# Patient Record
Sex: MALE | Race: WHITE | Employment: FULL TIME
[De-identification: names, ages, dates, MRNs, and addresses within clinical notes are randomized per-mention and may not be internally consistent; named-entity substitution may affect disease eponyms.]

---

## 2022-04-04 ENCOUNTER — NURSE TRIAGE (OUTPATIENT)
Dept: OTHER | Facility: CLINIC | Age: 32
End: 2022-04-04

## 2022-04-06 ENCOUNTER — OFFICE VISIT (OUTPATIENT)
Dept: FAMILY MEDICINE CLINIC | Age: 32
End: 2022-04-06
Payer: COMMERCIAL

## 2022-04-06 VITALS
WEIGHT: 204 LBS | OXYGEN SATURATION: 97 % | TEMPERATURE: 98.7 F | DIASTOLIC BLOOD PRESSURE: 88 MMHG | HEIGHT: 70 IN | SYSTOLIC BLOOD PRESSURE: 131 MMHG | RESPIRATION RATE: 18 BRPM | BODY MASS INDEX: 29.2 KG/M2 | HEART RATE: 67 BPM

## 2022-04-06 DIAGNOSIS — R00.2 HEART PALPITATIONS: ICD-10-CM

## 2022-04-06 DIAGNOSIS — Z82.49 FAMILY HISTORY OF EARLY CAD: ICD-10-CM

## 2022-04-06 DIAGNOSIS — R00.0 TACHYCARDIA: Primary | ICD-10-CM

## 2022-04-06 PROCEDURE — 99204 OFFICE O/P NEW MOD 45 MIN: CPT | Performed by: FAMILY MEDICINE

## 2022-04-06 PROCEDURE — 93000 ELECTROCARDIOGRAM COMPLETE: CPT | Performed by: FAMILY MEDICINE

## 2022-04-06 NOTE — PATIENT INSTRUCTIONS
A Healthy Lifestyle: Care Instructions  Your Care Instructions     A healthy lifestyle can help you feel good, stay at a healthy weight, and have plenty of energy for both work and play. A healthy lifestyle is something you can share with your whole family. A healthy lifestyle also can lower your risk for serious health problems, such as high blood pressure, heart disease, and diabetes. You can follow a few steps listed below to improve your health and the health of your family. Follow-up care is a key part of your treatment and safety. Be sure to make and go to all appointments, and call your doctor if you are having problems. It's also a good idea to know your test results and keep a list of the medicines you take. How can you care for yourself at home? · Do not eat too much sugar, fat, or fast foods. You can still have dessert and treats now and then. The goal is moderation. · Start small to improve your eating habits. Pay attention to portion sizes, drink less juice and soda pop, and eat more fruits and vegetables. ? Eat a healthy amount of food. A 3-ounce serving of meat, for example, is about the size of a deck of cards. Fill the rest of your plate with vegetables and whole grains. ? Limit the amount of soda and sports drinks you have every day. Drink more water when you are thirsty. ? Eat plenty of fruits and vegetables every day. Have an apple or some carrot sticks as an afternoon snack instead of a candy bar. Try to have fruits and/or vegetables at every meal.  · Make exercise part of your daily routine. You may want to start with simple activities, such as walking, bicycling, or slow swimming. Try to be active 30 to 60 minutes every day. You do not need to do all 30 to 60 minutes all at once. For example, you can exercise 3 times a day for 10 or 20 minutes.  Moderate exercise is safe for most people, but it is always a good idea to talk to your doctor before starting an exercise program.  · Keep moving. Loida Shepherd the lawn, work in the garden, or ZIIBRA. Take the stairs instead of the elevator at work. · If you smoke, quit. People who smoke have an increased risk for heart attack, stroke, cancer, and other lung illnesses. Quitting is hard, but there are ways to boost your chance of quitting tobacco for good. ? Use nicotine gum, patches, or lozenges. ? Ask your doctor about stop-smoking programs and medicines. ? Keep trying. In addition to reducing your risk of diseases in the future, you will notice some benefits soon after you stop using tobacco. If you have shortness of breath or asthma symptoms, they will likely get better within a few weeks after you quit. · Limit how much alcohol you drink. Moderate amounts of alcohol (up to 2 drinks a day for men, 1 drink a day for women) are okay. But drinking too much can lead to liver problems, high blood pressure, and other health problems. Family health  If you have a family, there are many things you can do together to improve your health. · Eat meals together as a family as often as possible. · Eat healthy foods. This includes fruits, vegetables, lean meats and dairy, and whole grains. · Include your family in your fitness plan. Most people think of activities such as jogging or tennis as the way to fitness, but there are many ways you and your family can be more active. Anything that makes you breathe hard and gets your heart pumping is exercise. Here are some tips:  ? Walk to do errands or to take your child to school or the bus.  ? Go for a family bike ride after dinner instead of watching TV. Where can you learn more? Go to http://www.gray.com/  Enter L560 in the search box to learn more about \"A Healthy Lifestyle: Care Instructions. \"  Current as of: June 16, 2021               Content Version: 13.2  © 6531-5497 Healthwise, [employer].    Care instructions adapted under license by [employer] (which disclaims liability or warranty for this information). If you have questions about a medical condition or this instruction, always ask your healthcare professional. [address] any warranty or liability for your use of this information.

## 2022-04-06 NOTE — PROGRESS NOTES
Progress Note    he is a 28y.o. year old male who presents for evalution. Subjective:     Pt states he has episodes where his HR jumps up into the 160's out of no where, was just sitting there got an uneasy feeling and his HR was \"thru the roof\" started to get light headed and dizzy and some pressure in his chest.  Feels like he is out of breath when this occurs. Episodes last varies this last time lasted an hour and felt off from  until yesterday afternoon. He has a [employer] watch but is not able to use the ECG feature so no rhythm strip. HR shows up to 158 on  on his phone via watch. Dad  from MI age 52, was his 2rd. Denies fam hx of WPW or LQTS or SVT or other arrhythmia that he is aware of. States \"My dad side is littered with all sorts of heart issues\". Pt does not know last time his chol was checked. He is not smoker who does use dip tho and has been recommended to stop. Does not really exercise past couple of years. Couple months ago was walking around the neighborhood and got a weird feeling and had to walk home. Feeling of drained energy. Reviewed PmHx, RxHx, FmHx, SocHx, AllgHx and updated and dated in the chart. Review of Systems - negative except as listed above in the HPI    Objective:     Vitals:    22 1047   BP: 131/88   Pulse: 67   Resp: 18   Temp: 98.7 °F (37.1 °C)   TempSrc: Oral   SpO2: 97%   Weight: 204 lb (92.5 kg)   Height: 5' 10\" (1.778 m)       No current outpatient medications on file. No current facility-administered medications for this visit. Physical Examination: General appearance - alert, well appearing, and in no distress  Chest - clear to auscultation, no wheezes, rales or rhonchi, symmetric air entry  Heart - normal rate, regular rhythm, normal S1, S2, no murmurs, rubs, clicks or gallops      Assessment/ Plan:   Diagnoses and all orders for this visit:    1.  Tachycardia  -     METABOLIC PANEL, COMPREHENSIVE; Future  -     CBC W/O DIFF; Future  -     MAGNESIUM; Future  -     TSH 3RD GENERATION; Future  -     AMB POC EKG ROUTINE W/ 12 LEADS, INTER & REP  -     REFERRAL TO CARDIOLOGY  Possible SVT? Pt unable to use his ECG feature requires [employer] phone which he states due for new phone so he will get [employer] to open up feature. If he can get a rhythm strip during an episode this would be very helpful. Discussed if having symptomatic episode go to ED do NOT drive self. Normal ECG at 62 bpm  2. Family history of early CAD  -     LIPID PANEL; Future    3. Heart palpitations  -     REFERRAL TO CARDIOLOGY       Follow-up and Dispositions    · Return if symptoms worsen or fail to improve. I have discussed the diagnosis with the patient and the intended plan as seen in the above orders. The patient has received an after-visit summary and questions were answered concerning future plans. Pt conveyed understanding of plan.     Medication Side Effects and Warnings were discussed with patient    An electronic signature was used to authenticate this note  Mariaelena Diaz, DO

## 2022-04-06 NOTE — PROGRESS NOTES
Chief Complaint   Patient presents with    New Patient   [address]     Patient presents in office today as a NP to establish care. States that he has been having periods where his heart starts racing. Has an apple watch and states that it notifies him of his arrhythmia. States that HR jumps to 160 and starts to feel shaky. No other concerns. 1. Have you been to the ER, urgent care clinic since your last visit? Hospitalized since your last visit? No    2. Have you seen or consulted any other health care providers outside of the [address] [place] [address] since your last visit? Include any pap smears or colon screening.  No    Learning Assessment 4/6/2022   PRIMARY LEARNER Patient   PRIMARY LANGUAGE ENGLISH   LEARNER PREFERENCE PRIMARY LISTENING   ANSWERED BY self   RELATIONSHIP SELF

## 2022-04-07 LAB
ALBUMIN SERPL-MCNC: 4.3 G/DL (ref 3.5–5)
ALBUMIN/GLOB SERPL: 1.3 {RATIO} (ref 1.1–2.2)
ALP SERPL-CCNC: 55 U/L (ref 45–117)
ALT SERPL-CCNC: 32 U/L (ref 12–78)
ANION GAP SERPL CALC-SCNC: 6 MMOL/L (ref 5–15)
AST SERPL-CCNC: 15 U/L (ref 15–37)
BILIRUB SERPL-MCNC: 0.5 MG/DL (ref 0.2–1)
BUN SERPL-MCNC: 12 MG/DL (ref 6–20)
BUN/CREAT SERPL: 11 (ref 12–20)
CALCIUM SERPL-MCNC: 9 MG/DL (ref 8.5–10.1)
CHLORIDE SERPL-SCNC: 103 MMOL/L (ref 97–108)
CHOLEST SERPL-MCNC: 243 MG/DL
CO2 SERPL-SCNC: 27 MMOL/L (ref 21–32)
CREAT SERPL-MCNC: 1.09 MG/DL (ref 0.7–1.3)
ERYTHROCYTE [DISTWIDTH] IN BLOOD BY AUTOMATED COUNT: 11.5 % (ref 11.5–14.5)
GLOBULIN SER CALC-MCNC: 3.4 G/DL (ref 2–4)
GLUCOSE SERPL-MCNC: 98 MG/DL (ref 65–100)
HCT VFR BLD AUTO: 44.5 % (ref 36.6–50.3)
HDLC SERPL-MCNC: 33 MG/DL
HDLC SERPL: 7.4 {RATIO} (ref 0–5)
HGB BLD-MCNC: 15.3 G/DL (ref 12.1–17)
LDLC SERPL CALC-MCNC: 166.2 MG/DL (ref 0–100)
MAGNESIUM SERPL-MCNC: 2.2 MG/DL (ref 1.6–2.4)
MCH RBC QN AUTO: 30 PG (ref 26–34)
MCHC RBC AUTO-ENTMCNC: 34.4 G/DL (ref 30–36.5)
MCV RBC AUTO: 87.3 FL (ref 80–99)
NRBC # BLD: 0 K/UL (ref 0–0.01)
NRBC BLD-RTO: 0 PER 100 WBC
PLATELET # BLD AUTO: 286 K/UL (ref 150–400)
PMV BLD AUTO: 9.1 FL (ref 8.9–12.9)
POTASSIUM SERPL-SCNC: 4.2 MMOL/L (ref 3.5–5.1)
PROT SERPL-MCNC: 7.7 G/DL (ref 6.4–8.2)
RBC # BLD AUTO: 5.1 M/UL (ref 4.1–5.7)
SODIUM SERPL-SCNC: 136 MMOL/L (ref 136–145)
TRIGL SERPL-MCNC: 219 MG/DL (ref ?–150)
TSH SERPL DL<=0.05 MIU/L-ACNC: 1.63 UIU/ML (ref 0.36–3.74)
VLDLC SERPL CALC-MCNC: 43.8 MG/DL
WBC # BLD AUTO: 4.3 K/UL (ref 4.1–11.1)

## 2022-04-07 NOTE — PROGRESS NOTES
Thyroid magnesium and electrolytes are all normal.  Your cholesterol is elevated, work on low fat low cholesterol diet and start exercising, work up to 150 minutes of moderate intensity cardio a week. We should recheck your cholesterol in 3 months. Call and make an appt with the cardiologist as well.

## 2022-04-11 ENCOUNTER — OFFICE VISIT (OUTPATIENT)
Dept: CARDIOLOGY CLINIC | Age: 32
End: 2022-04-11
Payer: COMMERCIAL

## 2022-04-11 VITALS
SYSTOLIC BLOOD PRESSURE: 148 MMHG | HEART RATE: 106 BPM | WEIGHT: 204 LBS | OXYGEN SATURATION: 98 % | DIASTOLIC BLOOD PRESSURE: 88 MMHG | HEIGHT: 70 IN | BODY MASS INDEX: 29.2 KG/M2

## 2022-04-11 DIAGNOSIS — Z82.49 FH ISCHEMIC HEART DISEASE: ICD-10-CM

## 2022-04-11 DIAGNOSIS — R00.0 RACING HEART BEAT: Primary | ICD-10-CM

## 2022-04-11 DIAGNOSIS — I10 PRIMARY HYPERTENSION: ICD-10-CM

## 2022-04-11 DIAGNOSIS — R07.89 CHEST PRESSURE: ICD-10-CM

## 2022-04-11 DIAGNOSIS — E78.5 DYSLIPIDEMIA: ICD-10-CM

## 2022-04-11 PROCEDURE — 99204 OFFICE O/P NEW MOD 45 MIN: CPT | Performed by: SPECIALIST

## 2022-04-11 NOTE — PATIENT INSTRUCTIONS
High Blood Pressure: Care Instructions  Overview     It's normal for blood pressure to go up and down throughout the day. But if it stays up, you have high blood pressure. Another name for high blood pressure is hypertension. Despite what a lot of people think, high blood pressure usually doesn't cause headaches or make you feel dizzy or lightheaded. It usually has no symptoms. But it does increase your risk of stroke, heart attack, and other problems. You and your doctor will talk about your risks of these problems based on your blood pressure. Your doctor will give you a goal for your blood pressure. Your goal will be based on your health and your age. Lifestyle changes, such as eating healthy and being active, are always important to help lower blood pressure. You might also take medicine to reach your blood pressure goal.  Follow-up care is a key part of your treatment and safety. Be sure to make and go to all appointments, and call your doctor if you are having problems. It's also a good idea to know your test results and keep a list of the medicines you take. How can you care for yourself at home? Medical treatment  · If you stop taking your medicine, your blood pressure will go back up. You may take one or more types of medicine to lower your blood pressure. Be safe with medicines. Take your medicine exactly as prescribed. Call your doctor if you think you are having a problem with your medicine. · Talk to your doctor before you start taking aspirin every day. Aspirin can help certain people lower their risk of a heart attack or stroke. But taking aspirin isn't right for everyone, because it can cause serious bleeding. · See your doctor regularly. You may need to see the doctor more often at first or until your blood pressure comes down. · If you are taking blood pressure medicine, talk to your doctor before you take decongestants or anti-inflammatory medicine, such as ibuprofen.  Some of these medicines can raise blood pressure. · Learn how to check your blood pressure at home. Lifestyle changes  · Stay at a healthy weight. This is especially important if you put on weight around the waist. Losing even 10 pounds can help you lower your blood pressure. · If your doctor recommends it, get more exercise. Walking is a good choice. Bit by bit, increase the amount you walk every day. Try for at least 30 minutes on most days of the week. You also may want to swim, bike, or do other activities. · Avoid or limit alcohol. Talk to your doctor about whether you can drink any alcohol. · Try to limit how much sodium you eat to less than 2,300 milligrams (mg) a day. Your doctor may ask you to try to eat less than 1,500 mg a day. · Eat plenty of fruits (such as bananas and oranges), vegetables, legumes, whole grains, and low-fat dairy products. · Lower the amount of saturated fat in your diet. Saturated fat is found in animal products such as milk, cheese, and meat. Limiting these foods may help you lose weight and also lower your risk for heart disease. · Do not smoke. Smoking increases your risk for heart attack and stroke. If you need help quitting, talk to your doctor about stop-smoking programs and medicines. These can increase your chances of quitting for good. When should you call for help? Call 911  anytime you think you may need emergency care. This may mean having symptoms that suggest that your blood pressure is causing a serious heart or blood vessel problem. Your blood pressure may be over 180/120. For example, call 911 if:    · You have symptoms of a heart attack. These may include:  ? Chest pain or pressure, or a strange feeling in the chest.  ? Sweating. ? Shortness of breath. ? Nausea or vomiting. ? Pain, pressure, or a strange feeling in the back, neck, jaw, or upper belly or in one or both shoulders or arms. ? Lightheadedness or sudden weakness.   ? A fast or irregular heartbeat.     · You have symptoms of a stroke. These may include:  ? Sudden numbness, tingling, weakness, or loss of movement in your face, arm, or leg, especially on only one side of your body. ? Sudden vision changes. ? Sudden trouble speaking. ? Sudden confusion or trouble understanding simple statements. ? Sudden problems with walking or balance. ? A sudden, severe headache that is different from past headaches.     · You have severe back or belly pain. Do not wait until your blood pressure comes down on its own. Get help right away. Call your doctor now or seek immediate care if:    · Your blood pressure is much higher than normal (such as 180/120 or higher), but you don't have symptoms.     · You think high blood pressure is causing symptoms, such as:  ? Severe headache.  ? Blurry vision. Watch closely for changes in your health, and be sure to contact your doctor if:    · Your blood pressure measures higher than your doctor recommends at least 2 times. That means the top number is higher or the bottom number is higher, or both.     · You think you may be having side effects from your blood pressure medicine. Where can you learn more? Go to http://www.gray.com/  Enter [id number] in the search box to learn more about \"High Blood Pressure: Care Instructions. \"  Current as of: January 10, 2022               Content Version: 13.2  © 2006-2022 [employer]. Care instructions adapted under license by [employer] (which disclaims liability or warranty for this information). If you have questions about a medical condition or this instruction, always ask your healthcare professional. [place] [address] any warranty or liability for your use of this information. DASH Diet: Care Instructions  Your Care Instructions     The DASH diet is an eating plan that can help lower your blood pressure. DASH stands for Dietary Approaches to Stop Hypertension. Hypertension is high blood pressure. The DASH diet focuses on eating foods that are high in calcium, potassium, and magnesium. These nutrients can lower blood pressure. The foods that are highest in these nutrients are fruits, vegetables, low-fat dairy products, nuts, seeds, and legumes. But taking calcium, potassium, and magnesium supplements instead of eating foods that are high in those nutrients does not have the same effect. The DASH diet also includes whole grains, fish, and poultry. The DASH diet is one of several lifestyle changes your doctor may recommend to lower your high blood pressure. Your doctor may also want you to decrease the amount of sodium in your diet. Lowering sodium while following the DASH diet can lower blood pressure even further than just the DASH diet alone. Follow-up care is a key part of your treatment and safety. Be sure to make and go to all appointments, and call your doctor if you are having problems. It's also a good idea to know your test results and keep a list of the medicines you take. How can you care for yourself at home? Following the DASH diet  · Eat 4 to 5 servings of fruit each day. A serving is 1 medium-sized piece of fruit, ½ cup chopped or canned fruit, 1/4 cup dried fruit, or 4 ounces (½ cup) of fruit juice. Choose fruit more often than fruit juice. · Eat 4 to 5 servings of vegetables each day. A serving is 1 cup of lettuce or raw leafy vegetables, ½ cup of chopped or cooked vegetables, or 4 ounces (½ cup) of vegetable juice. Choose vegetables more often than vegetable juice. · Get 2 to 3 servings of low-fat and fat-free dairy each day. A serving is 8 ounces of milk, 1 cup of yogurt, or 1 ½ ounces of cheese. · Eat 6 to 8 servings of grains each day. A serving is 1 slice of bread, 1 ounce of dry cereal, or ½ cup of cooked rice, pasta, or cooked cereal. Try to choose whole-grain products as much as possible.   · Limit lean meat, poultry, and fish to 2 servings each day. A serving is 3 ounces, about the size of a deck of cards. · Eat 4 to 5 servings of nuts, seeds, and legumes (cooked dried beans, lentils, and split peas) each week. A serving is 1/3 cup of nuts, 2 tablespoons of seeds, or ½ cup of cooked beans or peas. · Limit fats and oils to 2 to 3 servings each day. A serving is 1 teaspoon of vegetable oil or 2 tablespoons of salad dressing. · Limit sweets and added sugars to 5 servings or less a week. A serving is 1 tablespoon jelly or jam, ½ cup sorbet, or 1 cup of lemonade. · Eat less than 2,300 milligrams (mg) of sodium a day. If you limit your sodium to 1,500 mg a day, you can lower your blood pressure even more. · Be aware that all of these are the suggested number of servings for people who eat 1,800 to 2,000 calories a day. Your recommended number of servings may be different if you need more or fewer calories. Tips for success  · Start small. Do not try to make dramatic changes to your diet all at once. You might feel that you are missing out on your favorite foods and then be more likely to not follow the plan. Make small changes, and stick with them. Once those changes become habit, add a few more changes. · Try some of the following:  ? Make it a goal to eat a fruit or vegetable at every meal and at snacks. This will make it easy to get the recommended amount of fruits and vegetables each day. ? Try yogurt topped with fruit and nuts for a snack or healthy dessert. ? Add lettuce, tomato, cucumber, and onion to sandwiches. ? Combine a ready-made pizza crust with low-fat mozzarella cheese and lots of vegetable toppings. Try using tomatoes, squash, spinach, broccoli, carrots, cauliflower, and onions. ? Have a variety of cut-up vegetables with a low-fat dip as an appetizer instead of chips and dip. ? Sprinkle sunflower seeds or chopped almonds over salads. Or try adding chopped walnuts or almonds to cooked vegetables.   ? Try some vegetarian meals using beans and peas. Add garbanzo or kidney beans to salads. Make burritos and tacos with mashed aj beans or black beans. Where can you learn more? Go to http://www.gaona.com/  Enter H967 in the search box to learn more about \"DASH Diet: Care Instructions. \"  Current as of: January 10, 2022               Content Version: 13.2  © 2006-2022 Healthwise, [place]. Care instructions adapted under license by [employer] (which disclaims liability or warranty for this information). If you have questions about a medical condition or this instruction, always ask your healthcare professional. [address] any warranty or liability for your use of this information.

## 2022-04-11 NOTE — PROGRESS NOTES
Rebecca Leary MD. [facility] [place]              Patient: Christina Adair  : 1990      Today's Date: 2022          HISTORY OF PRESENT ILLNESS:     History of Present Illness:  Referred for palpitations, tachycardia     Dr. Clovis Siemens recently noted \"Pt states he has episodes where his HR jumps up into the 160's out of no where, was just sitting there got an uneasy feeling and his HR was \"thru the roof\" started to get light headed and dizzy and some pressure in his chest.  Feels like he is out of breath when this occurs. Episodes last varies this last time lasted an hour and felt off from  until yesterday afternoon. He has a [employer] watch but is not able to use the ECG feature so no rhythm strip. HR shows up to 158 on  on his phone via watch. Dad  from MI age 52, was his 2rd. Denies fam hx of WPW or LQTS or SVT or other arrhythmia that he is aware of. States \"My dad side is littered with all sorts of heart issues\". Pt does not know last time his chol was checked. He is not smoker who does use dip tho and has been recommended to stop. \"    Says HR can race into 160's -- started in 2021 (first time)  - happens every several days - can last an hour - has some chest pressure / SOB / Dizziness. He feels fine otherwise. Has some random SOB doing things sometimes.         PAST MEDICAL HISTORY:     Past Medical History:   Diagnosis Date    Dyslipidemia     FH ischemic heart disease     dad MI - initially at age 35    Hypertension          Past Surgical History:   Procedure Laterality Date    HX WISDOM TEETH EXTRACTION           MEDICATIONS:     No prescribed meds         No Known Allergies             SOCIAL HISTORY:     Social History     Tobacco Use    Smoking status: Former Smoker     Packs/day: 1.00     Years: 8.00     Pack years: 8.00     Types: Cigarettes     Quit date: 2013     Years since quittin.2    Smokeless tobacco: Current User     Types: Snuff Vaping Use    Vaping Use: Not on file   Substance Use Topics    Alcohol use: Yes     Alcohol/week: 8.0 standard drinks     Types: 8 Cans of beer per week     Comment: drinks on the weekends    Drug use: No         FAMILY HISTORY:     Family History   Problem Relation Age of Onset    Heart Attack Father         x2             REVIEW OF SYMPTOMS:     Review of Symptoms:  Constitutional: Negative for fever, chills  HEENT: Negative for nosebleeds, tinnitus, and vision changes. Respiratory: Negative for cough, wheezing  Cardiovascular: Negative for orthopnea, claudication, syncope, and PND. Gastrointestinal: Negative for abdominal pain, diarrhea, melena. Genitourinary: Negative for dysuria  Musculoskeletal: Negative for myalgias. Skin: Negative for rash  Heme: No problems bleeding. Neurological: Negative for speech change and focal weakness. PHYSICAL EXAM:     Physical Exam:  Visit Vitals  BP (!) 148/88 (BP 1 Location: Left upper arm, BP Patient Position: Sitting)   Pulse (!) 106   Ht 5' 10\" (1.778 m)   Wt 204 lb (92.5 kg)   SpO2 98%   BMI 29.27 kg/m²     Patient appears generally well, mood and affect are appropriate and pleasant. HEENT:  Hearing intact, non-icteric, normocephalic, atraumatic. Neck Exam: Supple, No JVD or carotid bruits. Lung Exam: Clear to auscultation, even breath sounds. Cardiac Exam: Regular rate and rhythm with no murmur or rub  Abdomen: Soft, non-tender, normal bowel sounds. No bruits or masses. Extremities: Moves all ext well. No lower extremity edema. MSKTL: Overall good ROM ext  Skin: No significant rashes  Vascular: 2+ dorsalis pedis pulses bilaterally.   Psych: Appropriate affect  Neuro - Grossly intact        LABS / OTHER STUDIES reviewed:     Lab Results   Component Value Date/Time    Sodium 136 04/06/2022 11:20 AM    Potassium 4.2 04/06/2022 11:20 AM    Chloride 103 04/06/2022 11:20 AM    CO2 27 04/06/2022 11:20 AM    Anion gap 6 04/06/2022 11:20 AM Glucose 98 04/06/2022 11:20 AM    BUN 12 04/06/2022 11:20 AM    Creatinine 1.09 04/06/2022 11:20 AM    BUN/Creatinine ratio 11 (L) 04/06/2022 11:20 AM    GFR est AA >60 04/06/2022 11:20 AM    GFR est non-AA >60 04/06/2022 11:20 AM    Calcium 9.0 04/06/2022 11:20 AM    Bilirubin, total 0.5 04/06/2022 11:20 AM    Alk. phosphatase 55 04/06/2022 11:20 AM    Protein, total 7.7 04/06/2022 11:20 AM    Albumin 4.3 04/06/2022 11:20 AM    Globulin 3.4 04/06/2022 11:20 AM    A-G Ratio 1.3 04/06/2022 11:20 AM    ALT (SGPT) 32 04/06/2022 11:20 AM    AST (SGOT) 15 04/06/2022 11:20 AM     Lab Results   Component Value Date/Time    WBC 4.3 04/06/2022 11:20 AM    HGB 15.3 04/06/2022 11:20 AM    HCT 44.5 04/06/2022 11:20 AM    PLATELET 951 31/07/4349 11:20 AM    MCV 87.3 04/06/2022 11:20 AM       Lab Results   Component Value Date/Time    Cholesterol, total 243 (H) 04/06/2022 11:20 AM    HDL Cholesterol 33 04/06/2022 11:20 AM    LDL, calculated 166.2 (H) 04/06/2022 11:20 AM    VLDL, calculated 43.8 04/06/2022 11:20 AM    Triglyceride 219 (H) 04/06/2022 11:20 AM    CHOL/HDL Ratio 7.4 (H) 04/06/2022 11:20 AM       Lab Results   Component Value Date/Time    TSH 1.63 04/06/2022 11:20 AM           CARDIAC DIAGNOSTICS:     Cardiac Evaluation Includes:  I reviewed the results below. EKG 4/6/22 - NSR, normal   I independently viewed and interpreted the test image(s) myself. ASSESSMENT AND PLAN:     Assessment and Plan:    1) Heart racing spells   - since October 2021 he has had HR racing spells up to 160 bpm lasting up to an hour   - Check a 30 day event monitor     2) Chest pressure with heart racing spells  - check an echo and treadmill stress test     3) CV Risk management   - Dad had a MI at a young age.   Also has dyslipidemia and history of smoking   - He has high long term risk of CV events given risk factors   - CT heart scan recommended for further risk assessment   - Discussed lifestyle changes to lower CV risk     3) Dyslipidemia   -  and HDL 33 on 4/6/22  - work on diet and exercise and reassess at next visit     4) HTN  - work on diet / exercise - goal < 130/80 ideally   - reassess in a few weeks     5) See me in 2 months     Works at [employer]. Single with a dog. Enjoys time on the water. Chandler Hercules MD, [place] [address].  [address] [place] [address]  Ph: [phone]   Ph [phone]      ADDENDUM   5/9/2022  Event Monitor 4/14/22-5/1/22 - wore 12 days - NSR, Avg HR 71 bpm (HR  bpm) - normal study     Echo / ETT pending     ADDENDUM   5/17/2022    Treadmill Stress test 5/17/22 - walked 9 min (10 METS), normal study     Will send a message

## 2022-04-11 NOTE — PROGRESS NOTES
No chief complaint on file. Visit Vitals  BP (!) 148/88 (BP 1 Location: Left upper arm, BP Patient Position: Sitting)   Pulse (!) 106   Ht 5' 10\" (1.778 m)   Wt 204 lb (92.5 kg)   SpO2 98%   BMI 29.27 kg/m²        Chest pain Yes Feels uncomfortable but no pain with the episode. SOB Yes with the episode of Plap. Palpitations Yes out of no where HR goes to 160 fingers fills numb. Usually it stays for hours, sometimes comes and goes. First episode was in October. Swelling in hands/feet denied. Dizziness YES along with Dizziness. Recent hospital stays denied   Refills denied     Non smoking Tabasco use (Nicotin Dip).

## 2022-04-12 NOTE — PROGRESS NOTES
Orders for Echo and treadmill stress test when possible. See me in 2 months    per Dr. Dashawn WHITTEN.   Dx: chest pressure, heart racing spells

## 2022-05-17 ENCOUNTER — ANCILLARY PROCEDURE (OUTPATIENT)
Dept: CARDIOLOGY CLINIC | Age: 32
End: 2022-05-17

## 2022-05-17 VITALS — BODY MASS INDEX: 29.2 KG/M2 | WEIGHT: 204 LBS | HEIGHT: 70 IN

## 2022-05-17 DIAGNOSIS — Z82.49 FH ISCHEMIC HEART DISEASE: ICD-10-CM

## 2022-05-17 DIAGNOSIS — I10 PRIMARY HYPERTENSION: ICD-10-CM

## 2022-05-17 DIAGNOSIS — R00.0 RACING HEART BEAT: ICD-10-CM

## 2022-05-17 DIAGNOSIS — E78.5 DYSLIPIDEMIA: ICD-10-CM

## 2022-05-17 DIAGNOSIS — R07.89 CHEST PRESSURE: ICD-10-CM

## 2022-05-17 LAB
STRESS ANGINA INDEX: 0
STRESS BASELINE DIAS BP: 74 MMHG
STRESS BASELINE HR: 76 BPM
STRESS BASELINE SYS BP: 120 MMHG
STRESS ESTIMATED WORKLOAD: 10.1 METS
STRESS EXERCISE DUR MIN: 9 MIN
STRESS EXERCISE DUR SEC: 0 SEC
STRESS O2 SAT PEAK: 97 %
STRESS O2 SAT REST: 98 %
STRESS PEAK DIAS BP: 74 MMHG
STRESS PEAK SYS BP: 178 MMHG
STRESS PERCENT HR ACHIEVED: 95 %
STRESS POST PEAK HR: 179 BPM
STRESS RATE PRESSURE PRODUCT: NORMAL BPM*MMHG
STRESS SR DUKE TREADMILL SCORE: 9
STRESS ST DEPRESSION: 0 MM
STRESS TARGET HR: 188 BPM

## 2022-05-17 PROCEDURE — 93015 CV STRESS TEST SUPVJ I&R: CPT | Performed by: SPECIALIST

## 2022-05-18 NOTE — PROGRESS NOTES
Dear Mr. Zhang,  Your stress test shows normal findings and looks good. Your recent heart monitor was also normal.   Please let me know if you have any questions.    Dr. Eliana Henry Notice

## 2022-06-14 ENCOUNTER — OFFICE VISIT (OUTPATIENT)
Dept: CARDIOLOGY CLINIC | Age: 32
End: 2022-06-14
Payer: COMMERCIAL

## 2022-06-14 VITALS
HEIGHT: 70 IN | OXYGEN SATURATION: 96 % | SYSTOLIC BLOOD PRESSURE: 120 MMHG | BODY MASS INDEX: 30.21 KG/M2 | WEIGHT: 211 LBS | HEART RATE: 65 BPM | DIASTOLIC BLOOD PRESSURE: 84 MMHG

## 2022-06-14 DIAGNOSIS — E78.5 DYSLIPIDEMIA: Primary | ICD-10-CM

## 2022-06-14 DIAGNOSIS — I10 PRIMARY HYPERTENSION: ICD-10-CM

## 2022-06-14 DIAGNOSIS — R00.0 RACING HEART BEAT: ICD-10-CM

## 2022-06-14 PROCEDURE — 99213 OFFICE O/P EST LOW 20 MIN: CPT | Performed by: SPECIALIST

## 2022-06-14 NOTE — PROGRESS NOTES
Kayla Lay MD. [facility] [place]              Patient: Xavier Velasquez  : 1990      Today's Date: 2022          HISTORY OF PRESENT ILLNESS:     History of Present Illness:  Referred for palpitations, tachycardia     Dr. Daisha Caldwell recently noted \"Pt states he has episodes where his HR jumps up into the 160's out of no where, was just sitting there got an uneasy feeling and his HR was \"thru the roof\" started to get light headed and dizzy and some pressure in his chest.  Feels like he is out of breath when this occurs. Episodes last varies this last time lasted an hour and felt off from  until yesterday afternoon. He has a [employer] watch but is not able to use the ECG feature so no rhythm strip. HR shows up to 158 on  on his phone via watch. Dad  from MI age 52, was his 2rd. Denies fam hx of WPW or LQTS or SVT or other arrhythmia that he is aware of. States \"My dad side is littered with all sorts of heart issues\". Pt does not know last time his chol was checked. He is not smoker who does use dip tho and has been recommended to stop. \"    On  - Says HR can race into 160's -- started in 2021 (first time)  - happens every several days - can last an hour - has some chest pressure / SOB / Dizziness. He feels fine otherwise. Has some random SOB doing things sometimes. On 22 - he has quit nicotine dip and feels great. No more complaints. No heart racing, CP, or SOB.           PAST MEDICAL HISTORY:     Past Medical History:   Diagnosis Date    Dyslipidemia     FH ischemic heart disease     dad MI - initially at age 35    Hypertension 2011    Racing heart beat          Past Surgical History:   Procedure Laterality Date    HX WISDOM TEETH EXTRACTION           MEDICATIONS:     No prescribed meds         No Known Allergies             SOCIAL HISTORY:     Social History     Tobacco Use    Smoking status: Former Smoker     Packs/day: 1.00     Years: 8.00 Pack years: 8.00     Types: Cigarettes     Quit date:      Years since quittin.4    Smokeless tobacco: Former User     Types: Snuff   Vaping Use    Vaping Use: Not on file   Substance Use Topics    Alcohol use: Yes     Alcohol/week: 8.0 standard drinks     Types: 8 Cans of beer per week     Comment: drinks on the weekends    Drug use: No         FAMILY HISTORY:     Family History   Problem Relation Age of Onset    Heart Attack Father         x2             REVIEW OF SYMPTOMS:     Review of Symptoms:  Constitutional: Negative for fever, chills  HEENT: Negative for nosebleeds, tinnitus, and vision changes. Respiratory: Negative for cough, wheezing  Cardiovascular: Negative for orthopnea, claudication, syncope, and PND. Gastrointestinal: Negative for abdominal pain, diarrhea, melena. Genitourinary: Negative for dysuria  Musculoskeletal: Negative for myalgias. Skin: Negative for rash  Heme: No problems bleeding. Neurological: Negative for speech change and focal weakness. PHYSICAL EXAM:     Physical Exam:  Visit Vitals  /84 (BP 1 Location: Right upper arm, BP Patient Position: Sitting)   Pulse 65   Ht 5' 10\" (1.778 m)   Wt 211 lb (95.7 kg)   SpO2 96%   BMI 30.28 kg/m²     Patient appears generally well, mood and affect are appropriate and pleasant. HEENT:  Hearing intact, non-icteric, normocephalic, atraumatic. Neck Exam: Supple, No JVD   Lung Exam: Clear to auscultation, even breath sounds. Cardiac Exam: Regular rate and rhythm with no murmur or rub  Abdomen: Soft, non-tender   Extremities: Moves all ext well. No lower extremity edema.   MSKTL: Overall good ROM ext  Skin: No significant rashes  Psych: Appropriate affect  Neuro - Grossly intact        LABS / OTHER STUDIES reviewed:     Lab Results   Component Value Date/Time    Sodium 136 2022 11:20 AM    Potassium 4.2 2022 11:20 AM    Chloride 103 2022 11:20 AM    CO2 27 2022 11:20 AM    Anion gap 6 04/06/2022 11:20 AM    Glucose 98 04/06/2022 11:20 AM    BUN 12 04/06/2022 11:20 AM    Creatinine 1.09 04/06/2022 11:20 AM    BUN/Creatinine ratio 11 (L) 04/06/2022 11:20 AM    GFR est AA >60 04/06/2022 11:20 AM    GFR est non-AA >60 04/06/2022 11:20 AM    Calcium 9.0 04/06/2022 11:20 AM    Bilirubin, total 0.5 04/06/2022 11:20 AM    Alk. phosphatase 55 04/06/2022 11:20 AM    Protein, total 7.7 04/06/2022 11:20 AM    Albumin 4.3 04/06/2022 11:20 AM    Globulin 3.4 04/06/2022 11:20 AM    A-G Ratio 1.3 04/06/2022 11:20 AM    ALT (SGPT) 32 04/06/2022 11:20 AM    AST (SGOT) 15 04/06/2022 11:20 AM     Lab Results   Component Value Date/Time    WBC 4.3 04/06/2022 11:20 AM    HGB 15.3 04/06/2022 11:20 AM    HCT 44.5 04/06/2022 11:20 AM    PLATELET 290 59/56/1472 11:20 AM    MCV 87.3 04/06/2022 11:20 AM       Lab Results   Component Value Date/Time    Cholesterol, total 243 (H) 04/06/2022 11:20 AM    HDL Cholesterol 33 04/06/2022 11:20 AM    LDL, calculated 166.2 (H) 04/06/2022 11:20 AM    VLDL, calculated 43.8 04/06/2022 11:20 AM    Triglyceride 219 (H) 04/06/2022 11:20 AM    CHOL/HDL Ratio 7.4 (H) 04/06/2022 11:20 AM       Lab Results   Component Value Date/Time    TSH 1.63 04/06/2022 11:20 AM           CARDIAC DIAGNOSTICS:     Cardiac Evaluation Includes:  I reviewed the results below.      EKG 4/6/22 - NSR, normal       Event Monitor 4/14/22-5/1/22 - wore 12 days - NSR, Avg HR 71 bpm (HR  bpm) - normal study     Treadmill Stress test 5/17/22 - walked 9 min (10 METS), normal study         ASSESSMENT AND PLAN:     Assessment and Plan:    1) Heart racing spells   - since October 2021 he has had HR racing spells up to 160 bpm lasting up to an hour   - Event Monitor 4/14/22-5/1/22 - wore 12 days - NSR, Avg HR 71 bpm (HR  bpm) - normal study   - On 6/14/22 - He stopped dipping nicotine and all his symptoms resolved     2) Chest pressure with heart racing spells  - Treadmill Stress test 5/17/22 - walked 9 min (10 METS), normal study   - On 6/14/22 - he has quit smoking and feels great. No more complaints. No heart racing, CP, or SOB. 3) CV Risk management   - Dad had a MI at a young age. Also has dyslipidemia and history of smoking   - He has high long term risk of CV events given risk factors   - CT heart scan recommended for further risk assessment   - Discussed lifestyle changes to lower CV risk     4) Dyslipidemia   -  and HDL 33 on 4/6/22  - work on diet and exercise and reassess at next visit   - recheck labs in 6 months - recommend a CT heart scan     5) HTN  - work on diet / exercise - goal < 130/80 ideally   - BP looks better at home after quitting nicotine dipping- 120/85 or so at home --> work on diet / exercise     6) See me in 6 months     Works at [employer]. Single with a dog. Enjoys time on the water (boating with friends). Nely Beltran MD, [address]  [address], [address]  [place], [address] [place] [address]  Ph: [phone]   Ph [phone]

## 2022-06-14 NOTE — PROGRESS NOTES
No chief complaint on file. There were no vitals taken for this visit.   Chest pain denied   SOB denied   Palpitations denied   Swelling in hands/feet denied   Dizziness denied   Recent hospital stays denied   Refills denied   Vitals:    06/14/22 0850 06/14/22 0853   BP: (!) 132/100 120/84   BP 1 Location: Left upper arm Right upper arm   BP Patient Position: Sitting Sitting   Pulse: 65    Height: 5' 10\" (1.778 m)    Weight: 211 lb (95.7 kg)    SpO2: 96%

## 2022-11-11 ENCOUNTER — DOCUMENTATION ONLY (OUTPATIENT)
Dept: CARDIOLOGY CLINIC | Age: 32
End: 2022-11-11

## 2022-11-11 NOTE — PROGRESS NOTES
[facility]. Faxed copy of OV 4/11/22 Dr Machuca Shaker 4/6/22 Dr Loida Cain, EKG 4/6/22 & stress 5/17/22. Faxed records to fax # [phone]. Received confirmation.